# Patient Record
Sex: MALE | Race: WHITE | HISPANIC OR LATINO
[De-identification: names, ages, dates, MRNs, and addresses within clinical notes are randomized per-mention and may not be internally consistent; named-entity substitution may affect disease eponyms.]

---

## 2019-07-30 PROBLEM — Z00.00 ENCOUNTER FOR PREVENTIVE HEALTH EXAMINATION: Status: ACTIVE | Noted: 2019-07-30

## 2020-06-15 PROBLEM — Z87.438 HISTORY OF ERECTILE DYSFUNCTION: Status: RESOLVED | Noted: 2020-06-15 | Resolved: 2020-06-15

## 2020-06-15 PROBLEM — Z87.440 HISTORY OF CHRONIC URINARY TRACT INFECTION: Status: RESOLVED | Noted: 2020-06-15 | Resolved: 2020-06-15

## 2020-06-15 PROBLEM — Z86.39 HISTORY OF DIABETES MELLITUS: Status: RESOLVED | Noted: 2020-06-15 | Resolved: 2020-06-15

## 2020-06-15 PROBLEM — Z87.448 HISTORY OF HEMATURIA: Status: RESOLVED | Noted: 2020-06-15 | Resolved: 2020-06-15

## 2020-06-15 PROBLEM — Z80.42 FAMILY HISTORY OF PROSTATE CANCER IN FATHER: Status: ACTIVE | Noted: 2020-06-15

## 2020-06-15 PROBLEM — Z86.79 HISTORY OF ESSENTIAL HYPERTENSION: Status: RESOLVED | Noted: 2020-06-15 | Resolved: 2020-06-15

## 2020-06-23 ENCOUNTER — APPOINTMENT (OUTPATIENT)
Dept: UROLOGY | Facility: CLINIC | Age: 60
End: 2020-06-23
Payer: MEDICARE

## 2020-06-23 VITALS
SYSTOLIC BLOOD PRESSURE: 138 MMHG | DIASTOLIC BLOOD PRESSURE: 83 MMHG | HEIGHT: 66 IN | HEART RATE: 96 BPM | TEMPERATURE: 97.91 F | WEIGHT: 200 LBS | BODY MASS INDEX: 32.14 KG/M2

## 2020-06-23 DIAGNOSIS — R33.9 RETENTION OF URINE, UNSPECIFIED: ICD-10-CM

## 2020-06-23 DIAGNOSIS — N13.8 BENIGN PROSTATIC HYPERPLASIA WITH LOWER URINARY TRACT SYMPMS: ICD-10-CM

## 2020-06-23 DIAGNOSIS — Z86.39 PERSONAL HISTORY OF OTHER ENDOCRINE, NUTRITIONAL AND METABOLIC DISEASE: ICD-10-CM

## 2020-06-23 DIAGNOSIS — F17.210 NICOTINE DEPENDENCE, CIGARETTES, UNCOMPLICATED: ICD-10-CM

## 2020-06-23 DIAGNOSIS — Z86.79 PERSONAL HISTORY OF OTHER DISEASES OF THE CIRCULATORY SYSTEM: ICD-10-CM

## 2020-06-23 DIAGNOSIS — Z80.42 FAMILY HISTORY OF MALIGNANT NEOPLASM OF PROSTATE: ICD-10-CM

## 2020-06-23 DIAGNOSIS — Z87.448 PERSONAL HISTORY OF OTHER DISEASES OF URINARY SYSTEM: ICD-10-CM

## 2020-06-23 DIAGNOSIS — Z87.440 PERSONAL HISTORY OF URINARY (TRACT) INFECTIONS: ICD-10-CM

## 2020-06-23 DIAGNOSIS — N40.1 BENIGN PROSTATIC HYPERPLASIA WITH LOWER URINARY TRACT SYMPMS: ICD-10-CM

## 2020-06-23 DIAGNOSIS — Z87.438 PERSONAL HISTORY OF OTHER DISEASES OF MALE GENITAL ORGANS: ICD-10-CM

## 2020-06-23 PROCEDURE — 99205 OFFICE O/P NEW HI 60 MIN: CPT

## 2020-06-23 PROCEDURE — 36415 COLL VENOUS BLD VENIPUNCTURE: CPT

## 2020-06-23 RX ORDER — INSULIN GLARGINE 100 [IU]/ML
INJECTION, SOLUTION SUBCUTANEOUS
Refills: 0 | Status: ACTIVE | COMMUNITY

## 2020-06-23 NOTE — PHYSICAL EXAM
[General Appearance - Well Developed] : well developed [General Appearance - Well Nourished] : well nourished [Edema] : no peripheral edema [] : no respiratory distress [Abdomen Soft] : soft [Abdomen Tenderness] : non-tender [Abdomen Mass (___ Cm)] : no abdominal mass palpated [Penis Abnormality] : normal uncircumcised penis [Testes Tenderness] : no tenderness of the testes [Testes Mass (___cm)] : there were no testicular masses [Prostate Tenderness] : the prostate was not tender [Prostate Size ___ gm] : prostate size [unfilled] gm [Normal Station and Gait] : the gait and station were normal for the patient's age [No Focal Deficits] : no focal deficits [Oriented To Time, Place, And Person] : oriented to person, place, and time [Not Anxious] : not anxious [FreeTextEntry1] : saravia inplace, TARIQ + nodule

## 2020-06-23 NOTE — ASSESSMENT
[FreeTextEntry1] : 59 year old male with a sig fam hx for CaP, PSA 75, + TARIQ large midline nodule, and in urinary retention\par \par 1. MRI prostate\par 2. follow up TEB to review MRi - cell phone\par 3. he will need prostate biopsy labs drawn today.\par 4. saravia changes q month with Dr Galeano\par \par Thank you very much for allowing me to assist in the care of this patient. Should you have any additional questions or concerns please do not hesitate to contact me.\par \par \par Sincerely,\par \par \par Mark Gomez D.O.\par  of Urology and Radiology\par  of Urology at [facility]\par System Director for Prostate Cancer\par [address] [place] [address], [address]\par Phone: [phone]\par \par \par

## 2020-06-23 NOTE — HISTORY OF PRESENT ILLNESS
[FreeTextEntry1] : Dear Dr. Orestes Galeano\par \par Thank you so much for the referral to help care for your patient.\par \par Chief Complaint: Hx of elevated PSA, urinary retention\par Date of first visit: 06/16/2020\par \par BAM JIMÉNEZ  is a 59 year old who presents for urinary retention x 3 months.  he has failed three TOVs. .\par Most recent PSA is 75.24 on 06/03/2020, prior PSA was 32.6 in 01/22/20 (before the saravia), 30.4 09/10/2019. He had antibiotics for elevated PSA. No prior prostate biopsy. Father had prostate cancer. Here to discuss prostate biopsy.  The patient has had a known elevated PSA for 6 months.  However, COVID has slowed the diagnostic pathway.\par \par He also had hx of UTI with E-Coli in April 2019, with antibiotics Cefuroxime via PICC line.\par Also has hx of BPH and has been on Tamsulosin for retention. Urinary symptoms include urgency, slow urinary stream, hesitancy, mild dysuria prior to retention. Had negative diagnostic cystoscopy and bladder biopsy in 04/20.\par \par 06/16/2020 \par IPSS NA Retention\par YOANDY \par EPIC 26 [] \par \par The patient denies fevers, chills, nausea and or vomiting and no unexplained weight loss.\par \par All pertinent laboratory, films and physician notes were reviewed.  Questionnaire results were discussed with patient.\par \par

## 2020-06-24 LAB
ANION GAP SERPL CALC-SCNC: 18 MMOL/L
BASOPHILS # BLD AUTO: 0.12 K/UL
BASOPHILS NFR BLD AUTO: 0.9 %
BUN SERPL-MCNC: 18 MG/DL
CALCIUM SERPL-MCNC: 9.4 MG/DL
CHLORIDE SERPL-SCNC: 97 MMOL/L
CO2 SERPL-SCNC: 21 MMOL/L
CREAT SERPL-MCNC: 0.68 MG/DL
EOSINOPHIL # BLD AUTO: 0.35 K/UL
EOSINOPHIL NFR BLD AUTO: 2.7 %
GLUCOSE SERPL-MCNC: 226 MG/DL
HCT VFR BLD CALC: 37 %
HGB BLD-MCNC: 10.4 G/DL
IMM GRANULOCYTES NFR BLD AUTO: 0.5 %
LYMPHOCYTES # BLD AUTO: 2.9 K/UL
LYMPHOCYTES NFR BLD AUTO: 22.1 %
MAN DIFF?: NORMAL
MCHC RBC-ENTMCNC: 21.3 PG
MCHC RBC-ENTMCNC: 28.1 GM/DL
MCV RBC AUTO: 75.8 FL
MONOCYTES # BLD AUTO: 1.06 K/UL
MONOCYTES NFR BLD AUTO: 8.1 %
NEUTROPHILS # BLD AUTO: 8.64 K/UL
NEUTROPHILS NFR BLD AUTO: 65.7 %
PLATELET # BLD AUTO: 539 K/UL
POTASSIUM SERPL-SCNC: 4.5 MMOL/L
PSA FREE FLD-MCNC: 25 %
PSA FREE SERPL-MCNC: 45.4 NG/ML
PSA SERPL-MCNC: 183 NG/ML
RBC # BLD: 4.88 M/UL
RBC # FLD: 18.8 %
SODIUM SERPL-SCNC: 136 MMOL/L
WBC # FLD AUTO: 13.14 K/UL

## 2020-07-15 ENCOUNTER — OUTPATIENT (OUTPATIENT)
Dept: OUTPATIENT SERVICES | Facility: HOSPITAL | Age: 60
LOS: 1 days | End: 2020-07-15
Payer: MEDICARE

## 2020-07-15 PROCEDURE — 78306 BONE IMAGING WHOLE BODY: CPT

## 2020-07-15 PROCEDURE — A9503: CPT

## 2020-07-15 PROCEDURE — 78306 BONE IMAGING WHOLE BODY: CPT | Mod: 26

## 2020-07-21 ENCOUNTER — APPOINTMENT (OUTPATIENT)
Dept: UROLOGY | Facility: CLINIC | Age: 60
End: 2020-07-21

## 2020-07-29 ENCOUNTER — OUTPATIENT (OUTPATIENT)
Dept: OUTPATIENT SERVICES | Facility: HOSPITAL | Age: 60
LOS: 1 days | End: 2020-07-29
Payer: MEDICARE

## 2020-07-29 ENCOUNTER — APPOINTMENT (OUTPATIENT)
Dept: CT IMAGING | Facility: HOSPITAL | Age: 60
End: 2020-07-29
Payer: MEDICARE

## 2020-07-29 ENCOUNTER — RESULT REVIEW (OUTPATIENT)
Age: 60
End: 2020-07-29

## 2020-07-29 PROCEDURE — 74177 CT ABD & PELVIS W/CONTRAST: CPT

## 2020-07-29 PROCEDURE — 74177 CT ABD & PELVIS W/CONTRAST: CPT | Mod: 26

## 2020-08-03 ENCOUNTER — LABORATORY RESULT (OUTPATIENT)
Age: 60
End: 2020-08-03

## 2020-08-03 ENCOUNTER — APPOINTMENT (OUTPATIENT)
Dept: UROLOGY | Facility: CLINIC | Age: 60
End: 2020-08-03
Payer: MEDICARE

## 2020-08-03 VITALS — SYSTOLIC BLOOD PRESSURE: 151 MMHG | TEMPERATURE: 96.9 F | HEART RATE: 92 BPM | DIASTOLIC BLOOD PRESSURE: 91 MMHG

## 2020-08-03 DIAGNOSIS — R93.89 ABNORMAL FINDINGS ON DIAGNOSTIC IMAGING OF OTHER SPECIFIED BODY STRUCTURES: ICD-10-CM

## 2020-08-03 DIAGNOSIS — N40.2 NODULAR PROSTATE W/OUT LOWER URINARY TRACT SYMPTOMS: ICD-10-CM

## 2020-08-03 DIAGNOSIS — R97.20 ELEVATED PROSTATE, SPECIFIC ANTIGEN [PSA]: ICD-10-CM

## 2020-08-03 PROCEDURE — 99215 OFFICE O/P EST HI 40 MIN: CPT

## 2020-08-03 PROCEDURE — 99358 PROLONG SERVICE W/O CONTACT: CPT

## 2020-08-03 NOTE — HISTORY OF PRESENT ILLNESS
[FreeTextEntry1] : Chief Complaint: Elevated PSa, follow up, post imaging studies\par Date of first visit: 06/23/2020\par \par BAM JIMÉNEZ  is a 59 year old ~race man ~ who presents for a follow up, after having several imaging studies, after MRI was done. He was seen on 06/23\par \par MRI ([facility]) read 06/26/2020. 100 cc, PSAD 0.75, PIRADS 5 lesion measuring 4.8 cm infiltrating the entire prostate gland > on the right. Tumor involving SVs bilaterally, with measurable extraprostatic extension. Tumor is extending through the posterior urinary bladder wall. Prominent B/L pelvis nodes.\par The images have been reviewed and clinical implications discussed with the patient.\par \par Bone Scan 07/15/2020 - No evidence of bony metastasis\par \par CT A/P 07/29/2020 - Reported several findings:\par Lower chest: Patchy ground-glass opacities and B/L cylindrical bronchiectasis\par Abdomen: Adenopathy - retroperitoneal stranding and bulky retroperitoneal and pelvic nodes suspicious for metastasis.\par Trace scattered pelvic ascites\par Prostate tumor with diffuse gland involvement, irregular bladder wall thickening, saravia balloon.\par \par He has not been yet seen by Oncology. Denies weight loss since last visit\par \par Patient also recently followed up with his other urologist Dr. Mattson, and had his catheter changed past friday. They wanted him to see ID specialist as he had positive bacterial organism in past cultures.\par \par He had urinary retention x 3 months. he has failed three TOVs. Most recent PSA is 75.24 on 06/03/2020, prior PSA was 32.6 in 01/22/20 (before the saravia), 30.4 09/10/2019. He had antibiotics for elevated PSA. No prior prostate biopsy. Father had prostate cancer.  The patient has had a known elevated PSA for 6 months. However, COVID has slowed the diagnostic pathway.\par \par He also had hx of UTI with E-Coli in April 2019, with antibiotics Cefuroxime via PICC line.\par \par Also has hx of BPH and has been on Tamsulosin for retention, still taking it. Urinary symptoms include urgency, slow urinary stream, hesitancy, mild dysuria prior to retention. Had negative diagnostic cystoscopy and bladder biopsy in 04/20.\par \par 07/21/2020 \par IPSS [] QOL [] \par YOANDY [] \par EPIC 26 [] \par \par The patient denies fevers, chills, nausea and or vomiting and no unexplained weight loss.\par \par All pertinent laboratory, films and physician notes were reviewed.  Questionnaire results were discussed with patient.\par \par I spent 31 minutes of non-face to face time reviewing the patient's records, chart, uploading processing MR images, transferring MR images from PACS to an independent workstation, reviewing images on independent workstation, speaking with their physician and planning their prostate biopsy and preparation of an upcoming visit for 08/03/2020 .  The imaging and planning was highly complex and took this entire time. \par \par

## 2020-08-03 NOTE — ASSESSMENT
[FreeTextEntry1] : 59 year old male with a sig fam hx for CaP, PSA 75, + TARIQ large midline nodule, and in urinary retention.  CT + N2 disease, but no tissue dx has occurred yet.\par \par The patient and i have discussed RBA associated with the prostate biopsy (TP approach)\par \par Presumed Stage Isaias prostate cancer T4 (? rectal wall and SV invastion, N1, M0\par \par 1. Prostate biopsy\par [facility] Dr Robinson Rangel.\par 3. Bone Scan Negative\par 4. labs\par 5. Medical Clearance - HTN DM\par \par Thank you very much for allowing me to assist in the care of this patient. Should you have any additional questions or concerns please do not hesitate to contact me.\par \par \par Sincerely,\par \par \par Mark oGmez D.O.\par  of Urology and Radiology\par  of Urology at [facility]\par System Director for Prostate Cancer\par [address] [place] [address], [address]\par Phone: [phone]\par \par \par

## 2020-08-03 NOTE — PHYSICAL EXAM
[General Appearance - Well Developed] : well developed [General Appearance - Well Nourished] : well nourished [Normal Appearance] : normal appearance [Well Groomed] : well groomed [General Appearance - In No Acute Distress] : no acute distress [Abdomen Soft] : soft [Abdomen Tenderness] : non-tender [Heart Rate And Rhythm] : Heart rate and rhythm were normal [Skin Color & Pigmentation] : normal skin color and pigmentation [Urethral Meatus] : meatus normal [Affect] : the affect was normal [] : no respiratory distress [Oriented To Time, Place, And Person] : oriented to person, place, and time [Mood] : the mood was normal [Not Anxious] : not anxious [Normal Station and Gait] : the gait and station were normal for the patient's age [No Palpable Adenopathy] : no palpable adenopathy [No Focal Deficits] : no focal deficits

## 2020-08-05 RX ORDER — SULFAMETHOXAZOLE AND TRIMETHOPRIM 800; 160 MG/1; MG/1
800-160 TABLET ORAL TWICE DAILY
Qty: 16 | Refills: 0 | Status: ACTIVE | COMMUNITY
Start: 2020-08-05

## 2020-08-13 ENCOUNTER — APPOINTMENT (OUTPATIENT)
Dept: UROLOGY | Facility: AMBULATORY SURGERY CENTER | Age: 60
End: 2020-08-13

## 2020-08-18 LAB — SARS-COV-2 N GENE NPH QL NAA+PROBE: NOT DETECTED

## 2020-08-19 ENCOUNTER — TRANSCRIPTION ENCOUNTER (OUTPATIENT)
Age: 60
End: 2020-08-19

## 2020-08-20 ENCOUNTER — APPOINTMENT (OUTPATIENT)
Dept: UROLOGY | Facility: AMBULATORY SURGERY CENTER | Age: 60
End: 2020-08-20

## 2020-08-20 ENCOUNTER — OUTPATIENT (OUTPATIENT)
Dept: OUTPATIENT SERVICES | Facility: HOSPITAL | Age: 60
LOS: 1 days | Discharge: HOME | End: 2020-08-20
Payer: MEDICARE

## 2020-08-20 ENCOUNTER — RESULT REVIEW (OUTPATIENT)
Age: 60
End: 2020-08-20

## 2020-08-20 LAB
GLUCOSE BLDC GLUCOMTR-MCNC: 202 MG/DL — HIGH (ref 70–99)
GLUCOSE BLDC GLUCOMTR-MCNC: 221 MG/DL — HIGH (ref 70–99)
GLUCOSE BLDC GLUCOMTR-MCNC: 253 MG/DL — HIGH (ref 70–99)

## 2020-08-20 PROCEDURE — 88305 TISSUE EXAM BY PATHOLOGIST: CPT | Mod: 26

## 2020-08-20 PROCEDURE — 76942 ECHO GUIDE FOR BIOPSY: CPT | Mod: 26,59

## 2020-08-20 PROCEDURE — 76872 US TRANSRECTAL: CPT | Mod: 26

## 2020-08-20 PROCEDURE — 55706 BX PRST8 NDL SAT SAMPLING: CPT

## 2020-08-21 LAB — SURGICAL PATHOLOGY STUDY: SIGNIFICANT CHANGE UP
